# Patient Record
Sex: FEMALE | Race: WHITE | NOT HISPANIC OR LATINO | Employment: PART TIME | URBAN - METROPOLITAN AREA
[De-identification: names, ages, dates, MRNs, and addresses within clinical notes are randomized per-mention and may not be internally consistent; named-entity substitution may affect disease eponyms.]

---

## 2021-05-28 ENCOUNTER — RECORDS - HEALTHEAST (OUTPATIENT)
Dept: ADMINISTRATIVE | Facility: CLINIC | Age: 61
End: 2021-05-28

## 2021-05-29 ENCOUNTER — RECORDS - HEALTHEAST (OUTPATIENT)
Dept: ADMINISTRATIVE | Facility: CLINIC | Age: 61
End: 2021-05-29

## 2021-05-30 ENCOUNTER — RECORDS - HEALTHEAST (OUTPATIENT)
Dept: ADMINISTRATIVE | Facility: CLINIC | Age: 61
End: 2021-05-30

## 2021-05-31 ENCOUNTER — RECORDS - HEALTHEAST (OUTPATIENT)
Dept: ADMINISTRATIVE | Facility: CLINIC | Age: 61
End: 2021-05-31

## 2021-07-21 ENCOUNTER — RECORDS - HEALTHEAST (OUTPATIENT)
Dept: ADMINISTRATIVE | Facility: CLINIC | Age: 61
End: 2021-07-21

## 2022-03-25 ENCOUNTER — HOSPITAL ENCOUNTER (EMERGENCY)
Facility: HOSPITAL | Age: 62
Discharge: LEFT BEFORE DISCHARGE | End: 2022-03-25
Attending: EMERGENCY MEDICINE | Admitting: EMERGENCY MEDICINE
Payer: COMMERCIAL

## 2022-03-25 VITALS
TEMPERATURE: 98.3 F | HEART RATE: 104 BPM | OXYGEN SATURATION: 96 % | RESPIRATION RATE: 18 BRPM | BODY MASS INDEX: 24.91 KG/M2 | HEIGHT: 66 IN | WEIGHT: 155 LBS | DIASTOLIC BLOOD PRESSURE: 56 MMHG | SYSTOLIC BLOOD PRESSURE: 115 MMHG

## 2022-03-25 DIAGNOSIS — R50.9 FEVER, UNSPECIFIED FEVER CAUSE: ICD-10-CM

## 2022-03-25 DIAGNOSIS — D70.9 NEUTROPENIA, UNSPECIFIED TYPE (H): ICD-10-CM

## 2022-03-25 LAB
ALBUMIN UR-MCNC: 30 MG/DL
ANION GAP SERPL CALCULATED.3IONS-SCNC: 8 MMOL/L (ref 5–18)
APPEARANCE UR: CLEAR
BASOPHILS # BLD MANUAL: 0 10E3/UL (ref 0–0.2)
BASOPHILS NFR BLD MANUAL: 1 %
BILIRUB UR QL STRIP: NEGATIVE
BUN SERPL-MCNC: 10 MG/DL (ref 8–22)
C REACTIVE PROTEIN LHE: 8.1 MG/DL (ref 0–0.8)
CALCIUM SERPL-MCNC: 8.3 MG/DL (ref 8.5–10.5)
CHLORIDE BLD-SCNC: 107 MMOL/L (ref 98–107)
CO2 SERPL-SCNC: 23 MMOL/L (ref 22–31)
COLOR UR AUTO: YELLOW
CREAT SERPL-MCNC: 0.71 MG/DL (ref 0.6–1.1)
EOSINOPHIL # BLD MANUAL: 0 10E3/UL (ref 0–0.7)
EOSINOPHIL NFR BLD MANUAL: 0 %
ERYTHROCYTE [DISTWIDTH] IN BLOOD BY AUTOMATED COUNT: 14.9 % (ref 10–15)
FLUAV RNA SPEC QL NAA+PROBE: NEGATIVE
FLUBV RNA RESP QL NAA+PROBE: NEGATIVE
GFR SERPL CREATININE-BSD FRML MDRD: >90 ML/MIN/1.73M2
GLUCOSE BLD-MCNC: 117 MG/DL (ref 70–125)
GLUCOSE UR STRIP-MCNC: NEGATIVE MG/DL
GRANULAR CAST: 3 /LPF
HCT VFR BLD AUTO: 37.2 % (ref 35–47)
HGB BLD-MCNC: 12.6 G/DL (ref 11.7–15.7)
HGB UR QL STRIP: NEGATIVE
HOLD SPECIMEN: NORMAL
HYALINE CASTS: 1 /LPF
KETONES UR STRIP-MCNC: 20 MG/DL
LACTATE SERPL-SCNC: 0.7 MMOL/L (ref 0.7–2)
LEUKOCYTE ESTERASE UR QL STRIP: NEGATIVE
LYMPHOCYTES # BLD MANUAL: 0.8 10E3/UL (ref 0.8–5.3)
LYMPHOCYTES NFR BLD MANUAL: 44 %
MAGNESIUM SERPL-MCNC: 2 MG/DL (ref 1.8–2.6)
MCH RBC QN AUTO: 29.4 PG (ref 26.5–33)
MCHC RBC AUTO-ENTMCNC: 33.9 G/DL (ref 31.5–36.5)
MCV RBC AUTO: 87 FL (ref 78–100)
MONOCYTES # BLD MANUAL: 0.6 10E3/UL (ref 0–1.3)
MONOCYTES NFR BLD MANUAL: 30 %
MUCOUS THREADS #/AREA URNS LPF: PRESENT /LPF
NEUTROPHILS # BLD MANUAL: 0.5 10E3/UL (ref 1.6–8.3)
NEUTROPHILS NFR BLD MANUAL: 25 %
NITRATE UR QL: NEGATIVE
PH UR STRIP: 6 [PH] (ref 5–7)
PLAT MORPH BLD: ABNORMAL
PLATELET # BLD AUTO: 160 10E3/UL (ref 150–450)
POTASSIUM BLD-SCNC: 3.2 MMOL/L (ref 3.5–5)
RBC # BLD AUTO: 4.29 10E6/UL (ref 3.8–5.2)
RBC MORPH BLD: ABNORMAL
RBC URINE: 2 /HPF
SARS-COV-2 RNA RESP QL NAA+PROBE: NEGATIVE
SODIUM SERPL-SCNC: 138 MMOL/L (ref 136–145)
SP GR UR STRIP: 1.03 (ref 1–1.03)
SQUAMOUS EPITHELIAL: 1 /HPF
UROBILINOGEN UR STRIP-MCNC: 2 MG/DL
WBC # BLD AUTO: 1.9 10E3/UL (ref 4–11)
WBC URINE: 2 /HPF

## 2022-03-25 PROCEDURE — 36415 COLL VENOUS BLD VENIPUNCTURE: CPT | Performed by: FAMILY MEDICINE

## 2022-03-25 PROCEDURE — 83605 ASSAY OF LACTIC ACID: CPT | Performed by: FAMILY MEDICINE

## 2022-03-25 PROCEDURE — 81003 URINALYSIS AUTO W/O SCOPE: CPT | Performed by: FAMILY MEDICINE

## 2022-03-25 PROCEDURE — 87636 SARSCOV2 & INF A&B AMP PRB: CPT | Performed by: FAMILY MEDICINE

## 2022-03-25 PROCEDURE — 83735 ASSAY OF MAGNESIUM: CPT | Performed by: FAMILY MEDICINE

## 2022-03-25 PROCEDURE — 99283 EMERGENCY DEPT VISIT LOW MDM: CPT

## 2022-03-25 PROCEDURE — C9803 HOPD COVID-19 SPEC COLLECT: HCPCS

## 2022-03-25 PROCEDURE — 80048 BASIC METABOLIC PNL TOTAL CA: CPT | Performed by: FAMILY MEDICINE

## 2022-03-25 PROCEDURE — 36415 COLL VENOUS BLD VENIPUNCTURE: CPT | Performed by: EMERGENCY MEDICINE

## 2022-03-25 PROCEDURE — 86140 C-REACTIVE PROTEIN: CPT | Performed by: EMERGENCY MEDICINE

## 2022-03-25 PROCEDURE — 85027 COMPLETE CBC AUTOMATED: CPT | Performed by: EMERGENCY MEDICINE

## 2022-03-25 RX ORDER — AZITHROMYCIN 250 MG/1
250-500 TABLET, FILM COATED ORAL DAILY
COMMUNITY
Start: 2022-03-25 | End: 2022-03-29

## 2022-03-25 RX ORDER — METHOTREXATE SODIUM 2.5 MG/1
20 TABLET ORAL WEEKLY
COMMUNITY

## 2022-03-25 ASSESSMENT — ENCOUNTER SYMPTOMS
FREQUENCY: 0
DIFFICULTY URINATING: 0
SINUS PRESSURE: 1
DYSURIA: 0
HEMATURIA: 0
ABDOMINAL PAIN: 0
RHINORRHEA: 1
COUGH: 1
FEVER: 1
HEADACHES: 1

## 2022-03-25 ASSESSMENT — ACTIVITIES OF DAILY LIVING (ADL): DEPENDENT_IADLS:: INDEPENDENT

## 2022-03-25 NOTE — PHARMACY-ADMISSION MEDICATION HISTORY
Pharmacy Note - Admission Medication History    Pertinent Provider Information: patient took first dose of Zpak today before arrival at ER.  Dose receive rituxan infusions     ______________________________________________________________________    Prior To Admission (PTA) med list completed and updated in EMR.       PTA Med List   Medication Sig Note Last Dose     azithromycin (ZITHROMAX Z-CECI) 250 MG tablet Take 250-500 mg by mouth daily Took 500mg dose on 3/25  3/25/2022 at am     methotrexate 2.5 MG tablet Take 20 mg by mouth once a week On Saturdays  3/19/2022     UNABLE TO FIND MEDICATION NAME: Young living supplements  Sulfurzyme with orange essential oil  Inner defense  Allergyn       vitamin B complex with vitamin C (VITAMIN  B COMPLEX) tablet Take 1 tablet by mouth daily  3/25/2022 at am     Vitamin D3 (CHOLECALCIFEROL) 125 MCG (5000 UT) tablet Take 125-250 mcg by mouth daily 3/25/2022: Patient stated via testing with rheumatologist determined she doesn't make vitamin D and thus takes a high dose daily 3/25/2022 at am       Information source(s): Patient, Clinic records and [facility]  Method of interview communication: in-person    Summary of Changes to PTA Med List  New: all entered new  Discontinued: none  Changed: none    Patient was asked about OTC/herbal products specifically.  PTA med list reflects this.    In the past week, patient estimated taking medication this percent of the time:  greater than 90%.    Allergies were reviewed, assessed, and updated with the patient.      Patient does not use any multi-dose medications prior to admission.    The information provided in this note is only as accurate as the sources available at the time of the update(s).    Thank you for the opportunity to participate in the care of this patient.    Jeanette Cruz RPH  3/25/2022 1:49 PM

## 2022-03-25 NOTE — ED PROVIDER NOTES
"ED Triage Provider Note  [facility]  Encounter Date: Mar 25, 2022    History:  Chief Complaint   Patient presents with     congestion, fever     Freda Mclaughlin is a 61 year old female who presents to the ED with fever.  Patient reports fever today (101 at home).  Seen in clinic and found to have low WBC.  Started four days ago with nasal congestion and runny nose, frontal headache.  Took Mucinex and Nyquil.  Started azithromycin today.    Recently on Augmentin for sinus infection.  COVID in January.  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    WBC 1.5, neutrophils 0.3 ([facility])    CXR done in clinic \"The heart size and cardiomediastinal silhouette appear normal. Mild blunting of one of the posterior costophrenic angles which may be due to scarring or possibly a tiny pleural effusion. The lungs otherwise appear clear. No consolidation is seen. Previous left lower lobe opacities have resolved. Mild scoliosis.\"    Review of Systems:  No vomiting or diarrhea    Exam:  /53   Pulse 113   Temp 98.3  F (36.8  C) (Oral)   Resp 18   Ht 1.676 m (5' 6\")   Wt 70.3 kg (155 lb)   SpO2 97%   BMI 25.02 kg/m    General: No acute distress. Appears stated age.   Cardio: Regular rate, extremities well perfused  Resp: Normal work of breathing, grossly normal respiratory rate  Neuro: Alert. CN II-XII grossly intact. Grossly intact strength.     Medical Decision Making:  Patient arriving to the ED with problem as above. A medical screening exam was performed. Lab orders initiated from Triage. The patient is appropriate to wait in triage.    COVID/influenza ordered, COVID may still be positive from prior infection in January 2022.    Jatin Mead MD on 3/25/2022 at 11:57 AM    Diagnosis:  1. Fever, unspecified fever cause    2. Neutropenia, unspecified type (H)         Jatin Mead MD  03/25/22 0816       Jatin Mead MD  03/25/22 7766    "

## 2022-03-25 NOTE — CONSULTS
Care Management Initial Consult    General Information  Assessment completed with: VM-chart review,    Type of CM/SW Visit: Initial Assessment    Primary Care Provider verified and updated as needed: Yes   Readmission within the last 30 days: no previous admission in last 30 days      Reason for Consult: discharge planning  Advance Care Planning: Advance Care Planning Reviewed: no concerns identified          Communication Assessment  Patient's communication style: spoken language (English or Bilingual)    Hearing Difficulty or Deaf: no   Wear Glasses or Blind: no    Cognitive  Cognitive/Neuro/Behavioral:                        Living Environment:   People in home: spouse  Frantz  Current living Arrangements: house      Able to return to prior arrangements: yes       Family/Social Support:  Care provided by: self  Provides care for: no one  Marital Status:     Frantz       Description of Support System: Supportive, Involved    Support Assessment: Adequate family and caregiver support, Adequate social supports    Current Resources:   Patient receiving home care services: No     Community Resources: None  Equipment currently used at home: none  Supplies currently used at home: None    Employment/Financial:  Employment Status: employed full-time        Financial Concerns:     Referral to Financial Worker: No       Lifestyle & Psychosocial Needs:  Social Determinants of Health     Tobacco Use: Not on file   Alcohol Use: Not on file   Financial Resource Strain: Not on file   Food Insecurity: Not on file   Transportation Needs: Not on file   Physical Activity: Not on file   Stress: Not on file   Social Connections: Not on file   Intimate Partner Violence: Not on file   Depression: Not on file   Housing Stability: Not on file       Functional Status:  Prior to admission patient needed assistance:   Dependent ADLs:: Independent, Ambulation-no assistive device  Dependent IADLs:: Independent  Assesssment of  Functional Status: At functional baseline    Mental Health Status:          Chemical Dependency Status:                Values/Beliefs:  Spiritual, Cultural Beliefs, Ep[employer] Practices, Values that affect care:                 Additional Information:  Freda lives in a house with her .     She is independent with ADLs at baseline and drives and works.    Likely no discharge needs at this time.     to transport at discharge.    Meri Shen RN

## 2022-03-25 NOTE — ED NOTES
RN spoke with pt. Pt alert and orientated, calm and cooperative. Pt reports COVID in January and sinus issues ever since. Pt comes from clinic at provider advisement for abnormal WBC and neutrophil counts. Temperature at clinic was 103 F (pt questions) and 101 F at home. Temperature  98 F here. Pt reports no pain, but has chronic pain due to RA.

## 2022-03-25 NOTE — ED PROVIDER NOTES
EMERGENCY DEPARTMENT ENCOUNTER      NAME: Freda Mclaughlin  AGE: 61 year old female  YOB: 1960  MRN: [id number]  EVALUATION DATE & TIME: 3/25/2022 12:05 PM    PCP: Montserrat Crad    ED PROVIDER: Nathalie Ruffin DO      Chief Complaint   Patient presents with     congestion, fever         FINAL IMPRESSION:  1. Fever, unspecified fever cause    2. Neutropenia, unspecified type (H)          ED COURSE & MEDICAL DECISION MAKIN-year-old female history of moderate arthritis who is on methotrexate and Rituxan who was sent into the ED for evaluation of neutropenic fevers.  The patient initially presented outside clinic for evaluation of fever, nasal congestion, and cough.  The patient stated that her fever at home was 101.  Fever in clinic was reportedly 103.  Upon arrival to the ED patient did not have a fever.  She denied taking anything to help reduce her fever prior to ED arrival.  The patient's reported white blood cell count was 1.8 with an absolute for count of 0.3 earlier today.  Chest x-ray was also performed earlier today which was nondiagnostic.  Patient was started on azithromycin to treat a sinus infection.  Of note, she completed a course of Augmentin for a sinus infection approximately 1 week ago.  Here in the ED the patient is tachycardic upon arrival but she is otherwise hemodynamically stable.  She did not appear to be in any obvious distress discomfort and she was not ill-appearing at the time of her initial evaluation.  The patient was noted to have audible nasal congestion and tachycardia.  The remainder of physical exam was unremarkable.      The patient's CRP is elevated 8.1.  Lactic acid is normal.  UA does not show evidence of urinary tract infection.  The patient has mild hypokalemia but the remainder of her BMP was unremarkable.  Testing for COVID influenza are both negative.  The patient's white blood cell count was 1.9.  The ANC was 0.5.    The patient's case was discussed  with the on-call hematologist who recommended admission for neutropenic fever.    The patient was reevaluated and informed of the laboratory results and hematologist recommendations.  After discussing the risks associated with neutropenic fever the patient stated that she did not want to be admitted at this time.  She stated that she will follow up with her rheumatologist as an outpatient.  I informed the patient that we could contact her rheumatologist here in the ED to see if they would recommend admission as well.  The patient stated that even if her rheumatologist recommend admission she would still leave AMA.      Patient was then discharged home with instructions to return back to ED for any worsening fevers or any other new or concerning symptoms.    Pertinent Labs & Imaging studies reviewed. (See chart for details)  12:40 PM I met with the patient to gather history and to perform my initial exam. We discussed plans for the ED course, including diagnostic testing and treatment.   2:56 PM I rechecked and updated the patient.      At the conclusion of the encounter I discussed the results of all of the tests and the disposition. The questions were answered. The patient or family acknowledged understanding and was agreeable with the care plan.       PPE worn: n95 mask, goggles    MEDICATIONS GIVEN IN THE EMERGENCY:  Medications - No data to display    NEW PRESCRIPTIONS STARTED AT TODAY'S ER VISIT  New Prescriptions    No medications on file          =================================================================    HPI    Patient information was obtained from: Patient    Use of : N/A         Freda Mclaughlin is a 61 year old female with a pertinent history of basal cell carcinoma and hyperlipidemia who presents to this ED via private car for evaluation of nasal congestion and a fever.    Per chart review:  03/06/22 the patient was seen at [facility] for evaluation of multiple complaints.  "Patient reported sinus pressure and congestion after having COVID-19 one month ago. Her grandson, who she watches sometimes, was diagnosed with an ear infection and pink eye. Patient also endorsed developing right eye drainage. She was prescribed Augmentin and Ofloxacin 0.3% solution for her symptoms.      03/25/22 the patient was seen at Urgent Care for continued symptoms along with a fever after completing Augmentin. Chest x-ray demonstrated: heart size and cardiomediastinal silhouette appear normal. Mild blunting of one of the posterior costophrenic angles which may be due to scarring or possibly a tiny pleural effusion. The lungs otherwise appear clear. No consolidation is seen. Previous left lower lobe opacities have resolved. Mild scoliosis. WBC was 1.5.     Patient reports she had COVID-19 in January 2022. Since then, she has had ongoing issues with her sinuses. She states she has had a sinus infection for the past three weeks. Her current symptoms include nasal congestion, postnasal drip, rhinorrhea with green mucus, and a mild cough. She was prescribed a ten day course of Augmentin with mild relief. Patient reports waking up on 03/21 (4 days ago) after completing the Augmentin with left-sided sinus congestion and pressure, noting her face felt like \"cement\". Endorses a headache. She has been taking Mucinex and NyQuil with relief at night. Patient went to Urgent Care today as she woke up with a fever of 101 F. There, she was told she had a fever of 103.7 F but she believes the thermometer to be inaccurate. She also had a negative chest x-ray. She was prescribed azithromycin and already took her first dose today. No other medication for pain management. Patient was then called by the clinic due to a low white blood count and was advised to come in. Denies abdominal pain, urinary symptoms, or any other complaints at this time.    REVIEW OF SYSTEMS   Review of Systems   Constitutional: Positive for fever. " "  HENT: Positive for congestion, postnasal drip, rhinorrhea and sinus pressure (left).    Respiratory: Positive for cough (mild).    Gastrointestinal: Negative for abdominal pain.   Genitourinary: Negative for difficulty urinating, dysuria, frequency and hematuria.   Neurological: Positive for headaches.   All other systems reviewed and are negative.       PAST MEDICAL HISTORY:  History reviewed. No pertinent past medical history.    PAST SURGICAL HISTORY:  History reviewed. No pertinent surgical history.        CURRENT MEDICATIONS:    azithromycin (ZITHROMAX Z-CECI) 250 MG tablet  methotrexate 2.5 MG tablet  UNABLE TO FIND  vitamin B complex with vitamin C (VITAMIN  B COMPLEX) tablet  Vitamin D3 (CHOLECALCIFEROL) 125 MCG (5000 UT) tablet        ALLERGIES:  No Known Allergies    FAMILY HISTORY:  History reviewed. No pertinent family history.    SOCIAL HISTORY:   Social History     Socioeconomic History     Marital status:      Spouse name: None     Number of children: None     Years of education: None     Highest education level: None   Occupational History     None   Tobacco Use     Smoking status: None     Smokeless tobacco: None   Substance and Sexual Activity     Alcohol use: None     Drug use: None     Sexual activity: None   Other Topics Concern     None   Social History Narrative     None     Social Determinants of Health     Financial Resource Strain: Not on file   Food Insecurity: Not on file   Transportation Needs: Not on file   Physical Activity: Not on file   Stress: Not on file   Social Connections: Not on file   Intimate Partner Violence: Not on file   Housing Stability: Not on file       VITALS:  /56   Pulse 103   Temp 98.3  F (36.8  C) (Oral)   Resp 18   Ht 1.676 m (5' 6\")   Wt 70.3 kg (155 lb)   SpO2 96%   BMI 25.02 kg/m      PHYSICAL EXAM    General presentation: Alert, Vital signs reviewed. NAD  HENT: Audible nasal congestion. Oropharynx is moist and clear.   Eye: Pupils are " equal and reactive to light. EOMI  Neck: The neck is supple, with full ROM, with no evidence of meningismus.  Pulmonary: Currently in no acute respiratory distress. Normal, non labored respirations, the lung sounds are normal with good equal air movement. Clear to auscultation bilaterally.   Circulatory: Tachycardia with normal rhythm. Peripheral pulses are strong and equal. No murmurs, rubs, or gallops.   Abdominal: The abdomen is soft. Nontender. No rigidity, guarding, or rebound. Bowel sounds normal.   Neurologic: Alert, oriented to person, place, and time. No motor deficit. No sensory deficit. Cranial nerves II through XII are intact.  Musculoskeletal: No extremity tenderness. Full range of motion in all extremities. No extremity edema.   Skin: Skin color is normal. No rash. Warm. Dry to touch.      LAB:  All pertinent labs reviewed and interpreted.  Results for orders placed or performed during the hospital encounter of 03/25/22   Basic metabolic panel   Result Value Ref Range    Sodium 138 136 - 145 mmol/L    Potassium 3.2 (L) 3.5 - 5.0 mmol/L    Chloride 107 98 - 107 mmol/L    Carbon Dioxide (CO2) 23 22 - 31 mmol/L    Anion Gap 8 5 - 18 mmol/L    Urea Nitrogen 10 8 - 22 mg/dL    Creatinine 0.71 0.60 - 1.10 mg/dL    Calcium 8.3 (L) 8.5 - 10.5 mg/dL    Glucose 117 70 - 125 mg/dL    GFR Estimate >90 >60 mL/min/1.73m2   Lactic acid whole blood   Result Value Ref Range    Lactic Acid 0.7 0.7 - 2.0 mmol/L   Result Value Ref Range    Magnesium 2.0 1.8 - 2.6 mg/dL   UA with Microscopic reflex to Culture    Specimen: Urine, Clean Catch   Result Value Ref Range    Color Urine Yellow Colorless, Straw, Light Yellow, Yellow    Appearance Urine Clear Clear    Glucose Urine Negative Negative mg/dL    Bilirubin Urine Negative Negative    Ketones Urine 20  (A) Negative mg/dL    Specific Gravity Urine 1.027 1.001 - 1.030    Blood Urine Negative Negative    pH Urine 6.0 5.0 - 7.0    Protein Albumin Urine 30  (A) Negative mg/dL     Urobilinogen Urine 2.0 (A) <2.0 mg/dL    Nitrite Urine Negative Negative    Leukocyte Esterase Urine Negative Negative    Mucus Urine Present (A) None Seen /LPF    RBC Urine 2 <=2 /HPF    WBC Urine 2 <=5 /HPF    Squamous Epithelials Urine 1 <=1 /HPF    Hyaline Casts Urine 1 <=2 /LPF    Granular Casts Urine 3 (H) None Seen /LPF   Symptomatic; Yes; 3/21/2022 Influenza A/B & SARS-CoV2 (COVID-19) Virus PCR Multiplex Nasopharyngeal    Specimen: Nasopharyngeal; Swab   Result Value Ref Range    Influenza A PCR Negative Negative    Influenza B PCR Negative Negative    SARS CoV2 PCR Negative Negative   Extra Blood Culture Bottle   Result Value Ref Range    Hold Specimen JIC    CRP inflammation   Result Value Ref Range    CRP 8.1 (H) 0.0-<0.8 mg/dL   CBC with platelets and differential   Result Value Ref Range    WBC Count 1.9 (L) 4.0 - 11.0 10e3/uL    RBC Count 4.29 3.80 - 5.20 10e6/uL    Hemoglobin 12.6 11.7 - 15.7 g/dL    Hematocrit 37.2 35.0 - 47.0 %    MCV 87 78 - 100 fL    MCH 29.4 26.5 - 33.0 pg    MCHC 33.9 31.5 - 36.5 g/dL    RDW 14.9 10.0 - 15.0 %    Platelet Count 160 150 - 450 10e3/uL         I, Ana Fischer , am serving as a scribe to document services personally performed by Nathalie Ruffin DO based on my observation and the provider's statements to me. I, Nathalie Ruffin, attest that Ana Fischer is acting in a scribe capacity, has observed my performance of the services and has documented them in accordance with my direction.    Nathalie Ruffin DO  Emergency Medicine  [facility] EMERGENCY DEPARTMENT  [address] [place] [id number] [phone]     Nathalie Ruffin DO  03/25/22 8847

## 2022-03-25 NOTE — DISCHARGE INSTRUCTIONS
Admission for neutropenic fever was recommended.  Continue taking your Z-Krish at home.  Follow-up with your rheumatologist as soon as possible.  Return back to ED sooner for any worsening fevers or any other new or concerning symptoms peer

## 2022-03-25 NOTE — ED PROVIDER NOTES
Expected Patient Referral to ED  11:32 AM    Referring Clinic/Provider:  Dr. Karimi  [facility]    Reason for referral/Clinical facts:  61-year-old female with a history of rheumatoid arthritis who is on Rituxan and methotrexate is being sent into the ED for evaluation of neutropenic fever.  Patient initially presented to the clinic for evaluation of fever and nasal congestion.       Recommendations provided:  Send to ED for further evaluation    Caller was informed that this institution does possess the capabilities and/or resources to provide for patient and should be transferred to our facility.    Discussed that if direct admit is sought and any hurdles are encountered, this ED would be happy to see the patient and evaluate.    Informed caller that recommendations provided are recommendations based only on the facts provided and that they responsible to accept or reject the advice, or to seek a formal in person consultation as needed and that this ED will see/treat patient should they arrive.      Nathalie Ruffin DO  Emergency Medicine  [facility] EMERGENCY DEPARTMENT  [address] [place] [id number]  [phone]       Nathalie Ruffin DO  03/25/22 1132

## 2022-03-25 NOTE — ED TRIAGE NOTES
Treated for a sinus infection 3 weeks ago, started on Augmentin. On Monday pt started having a h/a, congestion with fever. Went to primary today and started on Z-pack (already started). Temp at primary was 103 (pt questions that), temp 98.3 now. Pt has a little bit of a h/a. Pt had Covid in January

## 2022-03-27 ENCOUNTER — HEALTH MAINTENANCE LETTER (OUTPATIENT)
Age: 62
End: 2022-03-27

## 2022-09-25 ENCOUNTER — HEALTH MAINTENANCE LETTER (OUTPATIENT)
Age: 62
End: 2022-09-25

## 2023-05-13 ENCOUNTER — HEALTH MAINTENANCE LETTER (OUTPATIENT)
Age: 63
End: 2023-05-13

## 2024-05-11 ENCOUNTER — HEALTH MAINTENANCE LETTER (OUTPATIENT)
Age: 64
End: 2024-05-11

## 2024-07-20 ENCOUNTER — HEALTH MAINTENANCE LETTER (OUTPATIENT)
Age: 64
End: 2024-07-20